# Patient Record
Sex: MALE | Race: WHITE
[De-identification: names, ages, dates, MRNs, and addresses within clinical notes are randomized per-mention and may not be internally consistent; named-entity substitution may affect disease eponyms.]

---

## 2018-05-29 ENCOUNTER — HOSPITAL ENCOUNTER (INPATIENT)
Dept: HOSPITAL 25 - OR | Age: 58
LOS: 2 days | Discharge: HOME | DRG: 320 | End: 2018-05-31
Attending: NEUROLOGICAL SURGERY | Admitting: NEUROLOGICAL SURGERY
Payer: COMMERCIAL

## 2018-05-29 DIAGNOSIS — M48.062: Primary | ICD-10-CM

## 2018-05-29 DIAGNOSIS — Z87.891: ICD-10-CM

## 2018-05-29 DIAGNOSIS — I10: ICD-10-CM

## 2018-05-29 DIAGNOSIS — F41.9: ICD-10-CM

## 2018-05-29 DIAGNOSIS — M47.812: ICD-10-CM

## 2018-05-29 PROCEDURE — 72100 X-RAY EXAM L-S SPINE 2/3 VWS: CPT

## 2018-05-29 RX ADMIN — Medication ONE: at 19:16

## 2018-05-29 RX ADMIN — TAPENTADOL HYDROCHLORIDE PRN MG: 50 TABLET, FILM COATED ORAL at 19:26

## 2018-05-29 RX ADMIN — FENTANYL CITRATE PRN MCG: 0.05 INJECTION, SOLUTION INTRAMUSCULAR; INTRAVENOUS at 12:29

## 2018-05-29 RX ADMIN — Medication ONE ML: at 08:40

## 2018-05-29 RX ADMIN — FENTANYL CITRATE PRN MCG: 0.05 INJECTION, SOLUTION INTRAMUSCULAR; INTRAVENOUS at 12:34

## 2018-05-29 NOTE — RAD
HISTORY: Decompressive lumbar laminectomy



COMPARISONS: November 02, 2017 MRI



VIEWS: 1 , single portable intraoperative view of the lumbar spine for localization during

spinal surgery, performed at 11:08 AM



FINDINGS: 



A single lateral portable intraoperative view of the lumbar spine is submitted for review.

There is transitional last lumbar type vertebral body. Using the counting scheme as

described on the previous MRI, with a partially lumbarized S1 vertebral body, a metallic

probe is noted opposite of L4-L5.



IMPRESSION: 

LIMITED PORTABLE VIEW OF THE LUMBAR SPINE FOR LOCALIZATION DURING SPINAL SURGERY

## 2018-05-30 PROCEDURE — 01NB0ZZ RELEASE LUMBAR NERVE, OPEN APPROACH: ICD-10-PCS | Performed by: NEUROLOGICAL SURGERY

## 2018-05-30 RX ADMIN — OXYCODONE HYDROCHLORIDE AND ACETAMINOPHEN PRN TAB: 5; 325 TABLET ORAL at 09:16

## 2018-05-30 RX ADMIN — PAROXETINE SCH MG: 10 TABLET, FILM COATED ORAL at 09:08

## 2018-05-30 RX ADMIN — OXYCODONE HYDROCHLORIDE AND ACETAMINOPHEN PRN TAB: 5; 325 TABLET ORAL at 21:23

## 2018-05-30 RX ADMIN — LISINOPRIL SCH MG: 10 TABLET ORAL at 09:08

## 2018-05-30 RX ADMIN — OMEPRAZOLE SCH MG: 20 CAPSULE, DELAYED RELEASE ORAL at 09:08

## 2018-05-30 RX ADMIN — AMLODIPINE BESYLATE SCH MG: 5 TABLET ORAL at 09:08

## 2018-05-30 RX ADMIN — TAPENTADOL HYDROCHLORIDE PRN MG: 50 TABLET, FILM COATED ORAL at 05:37

## 2018-05-30 RX ADMIN — HYDROCHLOROTHIAZIDE SCH MG: 25 TABLET ORAL at 09:08

## 2018-05-30 NOTE — PN
Progress Note





- Progress Note


Date of Service: 05/30/18


SOAP: 


Subjective:


[]POD # 1


Legs better


C/O incisional pain


Moderate drain output persists








Objective:


[]Dressing dry


Neuro intact








Assessment:


[]Satis post op course








Plan:


[]Monitor drain output

## 2018-05-31 VITALS — SYSTOLIC BLOOD PRESSURE: 133 MMHG | DIASTOLIC BLOOD PRESSURE: 73 MMHG

## 2018-05-31 RX ADMIN — HYDROCHLOROTHIAZIDE SCH MG: 25 TABLET ORAL at 07:45

## 2018-05-31 RX ADMIN — LISINOPRIL SCH MG: 10 TABLET ORAL at 07:45

## 2018-05-31 RX ADMIN — PAROXETINE SCH MG: 10 TABLET, FILM COATED ORAL at 07:45

## 2018-05-31 RX ADMIN — OMEPRAZOLE SCH MG: 20 CAPSULE, DELAYED RELEASE ORAL at 07:45

## 2018-05-31 RX ADMIN — AMLODIPINE BESYLATE SCH MG: 5 TABLET ORAL at 07:45

## 2018-05-31 NOTE — PN
Progress Note





- Progress Note


Date of Service: 05/31/18


SOAP: 


Subjective:


[S/p decompressive lumbar laminectomy L3-4 and L4-5.


]








Objective:


[


Vital Signs:








Temp Pulse Resp BP Pulse Ox


 


 98.1 F   83   18   133/73   98 


 


 05/31/18 07:09  05/31/18 07:09  05/31/18 07:45  05/31/18 07:09  05/31/18 07:09








]








Assessment:


[]








Plan:


[]

## 2018-05-31 NOTE — OP
DATE OF OPERATION:  05/29/18 - ROOM #331

 

DATE OF BIRTH:  [birth year]

 

SURGEON:  Too Mcknight MD

 

FIRST ASSISTANT:  SHADE Rios

 

ANESTHESIA:  General.

 

PRE-OP DIAGNOSIS:  Lumbar spinal stenosis, L3-4, L4-5.

 

POST-OP DIAGNOSIS:  Lumbar spinal stenosis, L3-4, L4-5.

 

OPERATIVE PROCEDURE:  Decompressive lumbar laminectomy, L3-4, L4-5 with 
foraminotomies, L3-4, L4-5.

 

DESCRIPTION OF PROCEDURE:  After satisfactory general anesthesia was obtained, 
the patient was placed on the operating table in the prone position with the 
chest supported on the Farzad frame and the back slightly flexed.  The lumbar 
region was then clipped, prepped, and draped in sterile manner for lumbar 
laminectomy and skin incision outlined from L3 to L5.  This incision was 
infiltrated with 1% Xylocaine with epinephrine after which it was turned down 
sharply to the level of the lumbar fascia.  The fascia was divided along the 
spinous processes from L3 to L5 and the paraspinal musculature was stripped 
away from these posterior elements using the periosteal elevator and monopolar 
cautery.  An intraoperative x-ray was obtained verifying proper interspace 
localization after which the initial decompression was carried out at L3-4.  
This was begun by removing the spinous processes of L3 and L4 with a 
combination of the Giovanna rib shear and Leksell rongeur.  The remaining 
portion of the base of the spinous process and inferior aspect of the lamina of 
L3 was then thinned out with a Midas Venancio drill and a decompression was carried 
out with Kerrisons.  This was carried superiorly until the attachment of the 
ligamentum flavum was taken down.  The pathology at this level was that of both 
ligamentous thickening as well as bony hypertrophy.  The decompression was 
carried out laterally and both L4 nerve roots were noted to be free in their 
course.

 

Attention was then directed to L4-5 level where the changes were somewhat more 
extensive.  There was more of a component of facet hypertrophy at this level.  
The decompression was carried superiorly until the ligamentum flavum was taken 
down. Ligamentum flavum was then removed with the Kerrison as well.  At the 
conclusion of the decompression, general foraminotomies had been carried out 
over both L5 nerve roots.  The disk was palpated and was noted to be quite firm 
and was not disturbed. It was felt that satisfactory decompression had been 
achieved.  After assuring adequate hemostasis, the wound was thoroughly 
irrigated after which Gelfoam was placed over the laminectomy defects.  A drain 
was then placed in the epidural space and tunneled out toward the right side.  
The fascia was reapproximated with 0 Vicryl sutures.  The subcutaneous tissue 
closed with 3-0 Vicryl suture and the skin closed with skin clips.  The 
estimated blood loss was 150 cc and the final sponge, padding, and needle 
counts were correct.  The patient was taken to the recovery room, extubated, 
and in stable condition.

 

 [id number]/CPS #: [id number]

[facility]

## 2019-01-14 ENCOUNTER — HOSPITAL ENCOUNTER (EMERGENCY)
Dept: HOSPITAL 25 - ED | Age: 59
Discharge: HOME | End: 2019-01-14
Payer: COMMERCIAL

## 2019-01-14 VITALS — DIASTOLIC BLOOD PRESSURE: 102 MMHG | SYSTOLIC BLOOD PRESSURE: 162 MMHG

## 2019-01-14 DIAGNOSIS — M54.9: Primary | ICD-10-CM

## 2019-01-14 DIAGNOSIS — Z87.891: ICD-10-CM

## 2019-01-14 DIAGNOSIS — I10: ICD-10-CM

## 2019-01-14 DIAGNOSIS — M54.5: ICD-10-CM

## 2019-01-14 PROCEDURE — 72100 X-RAY EXAM L-S SPINE 2/3 VWS: CPT

## 2019-01-14 PROCEDURE — 99283 EMERGENCY DEPT VISIT LOW MDM: CPT

## 2019-01-14 PROCEDURE — 96375 TX/PRO/DX INJ NEW DRUG ADDON: CPT

## 2019-01-14 PROCEDURE — 96374 THER/PROPH/DIAG INJ IV PUSH: CPT

## 2019-01-14 NOTE — ED
Back Pain





- HPI Summary


HPI Summary: 





This patient is a 58 year old M presenting to [facility] with a chief complaint of 

lower back pain for the last 10 days. He was seen in the ED on 1-8-19 for this 

issue. He was given prednisone with relief that did not last. 10 days ago the 

patient was working when he strained his back, this was the initial injury. 

After this the next day he had bilateral hip when transition from standing to 

sitting but this resolved the next day. Today he complains of worsening left 

lower back pain with radiation down the left leg. The patient rates the pain 9/

10 in severity. Symptoms aggravated by lying flat. He states the medications he 

has been taking have not provided significant relief. Patient reports mild 

bilateral LE numbness Patient denies weakness, perineum numbness, and 

incontinence. Pt had back surgery in may of 2018 by Dr. mcknight. 





- History of Current Complaint


Chief Complaint: EDBackInjuryPain


Stated Complaint: GENERAL PAIN


Time Seen by Provider: 01/14/19 09:15


Hx Obtained From: Patient


Onset/Duration: Lasting Days - 10, Still Present


Onset/Duration: Started Weeks Ago, Still Present


Timing: Constant


Back Pain Location: Is Diffuse


Severity Initially: Severe


Severity Currently: Severe


Pain Intensity: 9


Pain Scale Used: 0-10 Numeric


Associated Signs And Symptoms: Positive: Numbness - LE's bilat.  Negative: Fever

, Weakness, Bladder Incontinence, Bowel Incontinence





- Allergies/Home Medications


Allergies/Adverse Reactions: 


 Allergies











Allergy/AdvReac Type Severity Reaction Status Date / Time


 


No Known Allergies Allergy   Verified 01/14/19 09:12














PMH/Surg Hx/FS Hx/Imm Hx


Endocrine/Hematology History: 


   Denies: Hx Diabetes


Cardiovascular History: Reports: Hx Hypertension, Hx Syncope


   Denies: Hx Congenital Heart Disease, Hx Congestive Heart Failure, Hx 

Pacemaker/ICD


Respiratory History: 


   Denies: Hx Asthma, Hx Pneumonia


GI History: Reports: Hx Gastroesophageal Reflux Disease - WELL CONTROLLED WITH 

DAILY MEDS, Hx Ulcer


 History: 


   Denies: Hx Renal Disease


Musculoskeletal History: Reports: Hx Arthritis, Hx Back Problems, Hx Scoliosis


Sensory History: Reports: Hx Contacts or Glasses


   Denies: Hx Hearing Aid


Opthamlomology History: Reports: Hx Contacts or Glasses


Neurological History: Reports: Other Neuro Impairments/Disorders - PAIN CLINIC 

PATIENT.


   Denies: Hx Developmental Delay, Hx Headaches, Hx Migraine, Hx Nerve Disease, 

Hx Seizures, Hx Spinal Cord Injury, Hx Transient Ischemic Attacks (TIA)


Psychiatric History: Reports: Hx Anxiety


   Denies: Hx Panic Disorder





- Cancer History


Hx Chemotherapy: No





- Surgical History


Surgery Procedure, Year, and Place: LAMINECTOMY 5/2018


Infectious Disease History: No


Infectious Disease History: 


   Denies: Hx Clostridium Difficile, Traveled Outside the [place] in Last 30 Days





- Family History


Known Family History: Positive: Hypertension





- Social History


Alcohol Use: Weekly


Alcohol Amount: 36 drinks per week, 4-6 drinks a day


Substance Use Type: Reports: Marijuana


Substance Use Comment - Amount & Last Used: in the evenings to help him sleep


Smoking Status (MU): Former Smoker


Type: Cigarettes


Amount Used/How Often: 1PPD 30 YRS


Length of Time of Smoking/Using Tobacco: 30 YRS


Have You Smoked in the Last Year: No





Review of Systems


Negative: Fever


Gastrointestinal: Negative - incontinence. 


Genitourinary: Negative - incontinence. 


Musculoskeletal: Other - back pain 


Positive: Numbness.  Negative: Weakness


All Other Systems Reviewed And Are Negative: Yes





Physical Exam





- Summary


Physical Exam Summary: 








VITAL SIGNS: Reviewed.


GENERAL: Patient is a well-developed and nourished male who is lying 

comfortable in the stretcher. Patient is not in any acute respiratory distress.


HEAD AND FACE: No signs of trauma. No ecchymosis, hematomas or skull 

depressions. No sinus tenderness.


EYES: PERRLA, EOMI x 2, No injected conjunctiva, no nystagmus.


EARS: Hearing grossly intact. Ear canals and tympanic membranes are within 

normal limits.


MOUTH: Oropharynx within normal limits.


NECK: Supple, trachea is midline, no adenopathy, no JVD, no carotid bruit, no c-

spine tenderness, neck with full ROM.


CHEST: Symmetric, no tenderness at palpation


LUNGS: Clear to auscultation bilaterally. No wheezing or crackles.


CVS: Regular rate and rhythm, S1 and S2 present, no murmurs or gallops 

appreciated.


ABDOMEN: Soft, non-tender. No signs of distention. No rebound no guarding, and 

no masses palpated. Bowel sounds are normal.


Back: there is paraspinal muscle tenderness bilaterally in the L spine that is 

worse on the left than the right. He denies saddle paresthesia as well as 

urinary and fecal dysfunction, he denies weakness 


EXTREMITIES: FROM in all major joints, no edema, no cyanosis or clubbing.


NEURO: Alert and oriented x 3. No acute neurological deficits. Speech is normal 

and follows commands.


SKIN: Dry and warm


 





Triage Information Reviewed: Yes


Vital Signs On Initial Exam: 


 Initial Vitals











Temp Pulse Resp BP Pulse Ox


 


 98.4 F   82   20   164/112   98 


 


 01/14/19 09:08  01/14/19 09:08  01/14/19 09:08  01/14/19 09:08  01/14/19 09:08











Vital Signs Reviewed: Yes





Diagnostics





- Vital Signs


 Vital Signs











  Temp Pulse Resp BP Pulse Ox


 


 01/14/19 09:08  98.4 F  82  20  164/112  98














- Laboratory


Lab Statement: Any lab studies that have been ordered have been reviewed, and 

results considered in the medical decision making process.





- Radiology


  ** L spine xray


Radiology Interpretation Completed By: Radiologist


Summary of Radiographic Findings: 1. MILD TO MODERATE DEGENERATIVE DISC 

DISEASE.  2. POSTSURGICAL CHANGES.  ED physician has reviewed this radiology 

report.





Back Pain Course/Dx





- Course


Assessment/Plan: This patient is a 58 year old M presenting to [facility] with a 

chief complaint of lower back pain for the last 10 days. He was seen in the ED 

on 1-8-19 for this issue. He was given prednisone with relief that did not 

last. 10 days ago the patient was working when he strained his back, this was 

the initial injury. After this the next day he had bilateral hip when 

transition from standing to sitting but this resolved the next day. Today he 

complains of worsening left lower back pain with radiation down the left leg. 

The patient rates the pain 9/10 in severity. Symptoms aggravated by lying flat. 

He states the medications he has been taking have not provided significant 

relief. Patient reports mild bilateral LE numbness Patient denies weakness, 

perineum numbness, and incontinence. Pt had back surgery in may of 2018 by Dr. mcknight.  X-ray of the lumbar spine:  In the ED course the patient was given 

Decadron, Toradol, Norflex, and Percocet for the pain.  After medications the 

symptoms have significantly improved and now the patient will be discharged 

home with follow-up with Dr. Mcknight.  The patient and is able to ambulate and 

his own without any ataxia.  I have no suspicion for cord compression since the 

patient doesnt have any weakness or numbness and lower extremities.  Patient 

will also follow-up with her pain management for which the patient already has 

an appointment for them.  Patient was instructed to return to the emergency 

department if he develops any weakness, numbness, or difficulty ambulating as 

well as increase in pain.  The patient understands and agrees.  The patient is 

hemodynamically stable alert and oriented 3.





- Diagnoses


Differential Diagnosis/HQI/PQRI: Positive: Cauda Equina Syndrome, Compressive 

Cord Syndrome, Fracture, Herniated Disc, Strain, Sprain


Provider Diagnoses: 


 Back pain








Discharge





- Sign-Out/Discharge


Documenting (check all that apply): Patient Departure





- Discharge Plan


Condition: Stable


Disposition: HOME


Prescriptions: 


HYDROcodone/ACETAMIN 5-325 MG* [Norco 5-325 TAB*] 1 tab PO Q6H PRN #10 tab MDD 4


 PRN Reason: Pain


Methocarbamol TAB* [Robaxin 500 MG TAB*] 500 mg PO TID PRN #12 tab


 PRN Reason: Spasms - Back


methylPREDNISolone [Medrol Dosepak 4 MG*] 0 mg PO .SEE RADHA INSTRUCTION #1 tab


Patient Education Materials:  Low Back Strain (ED), Back Pain (ED)


Referrals: 


Carlos GREEN,Too ATKINS [Primary Care Provider] - 


Too Mcknight MD [Medical Doctor] - 2 Days


Additional Instructions: 


Follow up with your primary care physician in 1-3 days.


RETURN TO THE EMERGENCY DEPARTMENT FOR CHANGING OR WORSENING SYMPTOMS.


 








- Billing Disposition and Condition


Condition: STABLE


Disposition: Home





- Attestation Statements


Document Initiated by Tony: Yes


Documenting Scribe: Ga Donald 


Provider For Whom Tony is Documenting (Include Credential): Koby Jara MD 


Scribe Attestation: 


Ga CARVALHO scribed for Koby Jara MD  on 01/14/19 at 1808. 


Scribe Documentation Reviewed: Yes


Provider Attestation: 


The documentation as recorded by the Ga la  accurately reflects 

the service I personally performed and the decisions made by Koby todd MD 


Status of Scribe Document: Viewed